# Patient Record
Sex: FEMALE | Race: BLACK OR AFRICAN AMERICAN | NOT HISPANIC OR LATINO | Employment: OTHER | URBAN - METROPOLITAN AREA
[De-identification: names, ages, dates, MRNs, and addresses within clinical notes are randomized per-mention and may not be internally consistent; named-entity substitution may affect disease eponyms.]

---

## 2017-09-28 ENCOUNTER — HOSPITAL ENCOUNTER (EMERGENCY)
Facility: HOSPITAL | Age: 32
Discharge: HOME | End: 2017-09-28
Attending: EMERGENCY MEDICINE
Payer: MEDICAID

## 2017-09-28 VITALS
TEMPERATURE: 98 F | RESPIRATION RATE: 16 BRPM | DIASTOLIC BLOOD PRESSURE: 57 MMHG | HEART RATE: 80 BPM | SYSTOLIC BLOOD PRESSURE: 116 MMHG | OXYGEN SATURATION: 100 % | WEIGHT: 157 LBS | BODY MASS INDEX: 26.16 KG/M2 | HEIGHT: 65 IN

## 2017-09-28 DIAGNOSIS — M54.2 NECK PAIN: ICD-10-CM

## 2017-09-28 DIAGNOSIS — M54.9 BACK PAIN, UNSPECIFIED BACK LOCATION, UNSPECIFIED BACK PAIN LATERALITY, UNSPECIFIED CHRONICITY: Primary | ICD-10-CM

## 2017-09-28 LAB
B-HCG UR QL: NEGATIVE
CTP QC/QA: YES

## 2017-09-28 PROCEDURE — 99283 EMERGENCY DEPT VISIT LOW MDM: CPT | Mod: 25

## 2017-09-28 PROCEDURE — 63600175 PHARM REV CODE 636 W HCPCS: Performed by: NURSE PRACTITIONER

## 2017-09-28 PROCEDURE — 81025 URINE PREGNANCY TEST: CPT | Performed by: NURSE PRACTITIONER

## 2017-09-28 PROCEDURE — 96372 THER/PROPH/DIAG INJ SC/IM: CPT

## 2017-09-28 RX ORDER — ORPHENADRINE CITRATE 100 MG/1
100 TABLET, EXTENDED RELEASE ORAL 2 TIMES DAILY PRN
Qty: 20 TABLET | Refills: 0 | Status: SHIPPED | OUTPATIENT
Start: 2017-09-28 | End: 2017-10-08

## 2017-09-28 RX ORDER — DICLOFENAC SODIUM 50 MG/1
50 TABLET, DELAYED RELEASE ORAL 3 TIMES DAILY
Qty: 30 TABLET | Refills: 0 | Status: SHIPPED | OUTPATIENT
Start: 2017-09-28 | End: 2022-12-25

## 2017-09-28 RX ORDER — KETOROLAC TROMETHAMINE 30 MG/ML
60 INJECTION, SOLUTION INTRAMUSCULAR; INTRAVENOUS
Status: COMPLETED | OUTPATIENT
Start: 2017-09-28 | End: 2017-09-28

## 2017-09-28 RX ADMIN — KETOROLAC TROMETHAMINE 60 MG: 30 INJECTION, SOLUTION INTRAMUSCULAR at 11:09

## 2017-09-29 NOTE — ED NOTES
Pt reports back pain with mild improvement with otc medication. 

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
## 2020-05-29 ENCOUNTER — LAB VISIT (OUTPATIENT)
Dept: PRIMARY CARE CLINIC | Facility: CLINIC | Age: 35
End: 2020-05-29
Payer: MEDICAID

## 2020-05-29 DIAGNOSIS — R05.9 COUGH: Primary | ICD-10-CM

## 2020-05-29 PROCEDURE — U0003 INFECTIOUS AGENT DETECTION BY NUCLEIC ACID (DNA OR RNA); SEVERE ACUTE RESPIRATORY SYNDROME CORONAVIRUS 2 (SARS-COV-2) (CORONAVIRUS DISEASE [COVID-19]), AMPLIFIED PROBE TECHNIQUE, MAKING USE OF HIGH THROUGHPUT TECHNOLOGIES AS DESCRIBED BY CMS-2020-01-R: HCPCS

## 2020-05-31 LAB — SARS-COV-2 RNA RESP QL NAA+PROBE: NOT DETECTED

## 2021-03-12 ENCOUNTER — OFFICE VISIT (OUTPATIENT)
Dept: URGENT CARE | Facility: CLINIC | Age: 36
End: 2021-03-12
Payer: MEDICAID

## 2021-03-12 VITALS
DIASTOLIC BLOOD PRESSURE: 62 MMHG | HEART RATE: 79 BPM | RESPIRATION RATE: 16 BRPM | TEMPERATURE: 98 F | SYSTOLIC BLOOD PRESSURE: 115 MMHG

## 2021-03-12 DIAGNOSIS — Z20.822 ENCOUNTER FOR LABORATORY TESTING FOR COVID-19 VIRUS: ICD-10-CM

## 2021-03-12 DIAGNOSIS — Z20.822 COVID-19 VIRUS NOT DETECTED: Primary | ICD-10-CM

## 2021-03-12 LAB
CTP QC/QA: YES
SARS-COV-2 RDRP RESP QL NAA+PROBE: NEGATIVE

## 2021-03-12 PROCEDURE — 99203 PR OFFICE/OUTPT VISIT, NEW, LEVL III, 30-44 MIN: ICD-10-PCS | Mod: S$GLB,,, | Performed by: NURSE PRACTITIONER

## 2021-03-12 PROCEDURE — 87635 PR SARS-COV-2 COVID-19 AMPLIFIED PROBE: ICD-10-PCS | Mod: QW,S$GLB,, | Performed by: NURSE PRACTITIONER

## 2021-03-12 PROCEDURE — 99203 OFFICE O/P NEW LOW 30 MIN: CPT | Mod: S$GLB,,, | Performed by: NURSE PRACTITIONER

## 2021-03-12 PROCEDURE — 87635 SARS-COV-2 COVID-19 AMP PRB: CPT | Mod: QW,S$GLB,, | Performed by: NURSE PRACTITIONER

## 2021-04-29 ENCOUNTER — PATIENT MESSAGE (OUTPATIENT)
Dept: RESEARCH | Facility: HOSPITAL | Age: 36
End: 2021-04-29

## 2021-07-01 ENCOUNTER — PATIENT MESSAGE (OUTPATIENT)
Dept: ADMINISTRATIVE | Facility: OTHER | Age: 36
End: 2021-07-01

## 2021-12-22 ENCOUNTER — HOSPITAL ENCOUNTER (EMERGENCY)
Facility: HOSPITAL | Age: 36
Discharge: HOME | End: 2021-12-22
Attending: EMERGENCY MEDICINE
Payer: MEDICAID

## 2021-12-22 VITALS
HEIGHT: 63 IN | HEART RATE: 93 BPM | RESPIRATION RATE: 18 BRPM | DIASTOLIC BLOOD PRESSURE: 69 MMHG | BODY MASS INDEX: 28 KG/M2 | SYSTOLIC BLOOD PRESSURE: 126 MMHG | WEIGHT: 158 LBS | OXYGEN SATURATION: 100 % | TEMPERATURE: 98 F

## 2021-12-22 DIAGNOSIS — Z20.822 CONTACT WITH AND (SUSPECTED) EXPOSURE TO COVID-19: Primary | ICD-10-CM

## 2021-12-22 LAB — SARS-COV-2 RDRP RESP QL NAA+PROBE: NEGATIVE

## 2021-12-22 PROCEDURE — 99282 EMERGENCY DEPT VISIT SF MDM: CPT

## 2021-12-22 PROCEDURE — U0002 COVID-19 LAB TEST NON-CDC: HCPCS | Performed by: NURSE PRACTITIONER

## 2021-12-26 ENCOUNTER — HOSPITAL ENCOUNTER (EMERGENCY)
Facility: HOSPITAL | Age: 36
Discharge: HOME | End: 2021-12-26
Attending: EMERGENCY MEDICINE
Payer: MEDICAID

## 2021-12-26 VITALS
BODY MASS INDEX: 26.33 KG/M2 | DIASTOLIC BLOOD PRESSURE: 84 MMHG | HEART RATE: 83 BPM | WEIGHT: 158 LBS | SYSTOLIC BLOOD PRESSURE: 127 MMHG | HEIGHT: 65 IN | OXYGEN SATURATION: 95 % | RESPIRATION RATE: 18 BRPM | TEMPERATURE: 98 F

## 2021-12-26 DIAGNOSIS — U07.1 LAB TEST POSITIVE FOR DETECTION OF COVID-19 VIRUS: Primary | ICD-10-CM

## 2021-12-26 LAB — SARS-COV-2 RDRP RESP QL NAA+PROBE: POSITIVE

## 2021-12-26 PROCEDURE — U0002 COVID-19 LAB TEST NON-CDC: HCPCS | Performed by: EMERGENCY MEDICINE

## 2021-12-26 PROCEDURE — 99282 EMERGENCY DEPT VISIT SF MDM: CPT

## 2022-04-15 ENCOUNTER — HOSPITAL ENCOUNTER (EMERGENCY)
Facility: HOSPITAL | Age: 37
Discharge: HOME | End: 2022-04-15
Attending: EMERGENCY MEDICINE
Payer: MEDICAID

## 2022-04-15 ENCOUNTER — OFFICE VISIT (OUTPATIENT)
Dept: URGENT CARE | Facility: CLINIC | Age: 37
End: 2022-04-15
Payer: MEDICAID

## 2022-04-15 VITALS
RESPIRATION RATE: 16 BRPM | SYSTOLIC BLOOD PRESSURE: 106 MMHG | DIASTOLIC BLOOD PRESSURE: 71 MMHG | WEIGHT: 158 LBS | TEMPERATURE: 99 F | HEART RATE: 76 BPM | BODY MASS INDEX: 28 KG/M2 | HEIGHT: 63 IN

## 2022-04-15 VITALS
HEART RATE: 70 BPM | DIASTOLIC BLOOD PRESSURE: 68 MMHG | OXYGEN SATURATION: 98 % | WEIGHT: 158.31 LBS | BODY MASS INDEX: 26.34 KG/M2 | RESPIRATION RATE: 18 BRPM | TEMPERATURE: 99 F | SYSTOLIC BLOOD PRESSURE: 128 MMHG

## 2022-04-15 DIAGNOSIS — L03.011 CELLULITIS OF FINGER OF RIGHT HAND: Primary | ICD-10-CM

## 2022-04-15 LAB
B-HCG UR QL: NEGATIVE
HCV AB SERPL QL IA: NEGATIVE
HIV 1+2 AB+HIV1 P24 AG SERPL QL IA: NEGATIVE

## 2022-04-15 PROCEDURE — 99284 EMERGENCY DEPT VISIT MOD MDM: CPT

## 2022-04-15 PROCEDURE — 3074F SYST BP LT 130 MM HG: CPT | Mod: CPTII,S$GLB,, | Performed by: NURSE PRACTITIONER

## 2022-04-15 PROCEDURE — 3078F PR MOST RECENT DIASTOLIC BLOOD PRESSURE < 80 MM HG: ICD-10-PCS | Mod: CPTII,S$GLB,, | Performed by: NURSE PRACTITIONER

## 2022-04-15 PROCEDURE — 3008F PR BODY MASS INDEX (BMI) DOCUMENTED: ICD-10-PCS | Mod: CPTII,S$GLB,, | Performed by: NURSE PRACTITIONER

## 2022-04-15 PROCEDURE — 99213 PR OFFICE/OUTPT VISIT, EST, LEVL III, 20-29 MIN: ICD-10-PCS | Mod: S$GLB,,, | Performed by: NURSE PRACTITIONER

## 2022-04-15 PROCEDURE — 1160F RVW MEDS BY RX/DR IN RCRD: CPT | Mod: CPTII,S$GLB,, | Performed by: NURSE PRACTITIONER

## 2022-04-15 PROCEDURE — 3008F BODY MASS INDEX DOCD: CPT | Mod: CPTII,S$GLB,, | Performed by: NURSE PRACTITIONER

## 2022-04-15 PROCEDURE — 36415 COLL VENOUS BLD VENIPUNCTURE: CPT | Performed by: EMERGENCY MEDICINE

## 2022-04-15 PROCEDURE — 99213 OFFICE O/P EST LOW 20 MIN: CPT | Mod: S$GLB,,, | Performed by: NURSE PRACTITIONER

## 2022-04-15 PROCEDURE — 3074F PR MOST RECENT SYSTOLIC BLOOD PRESSURE < 130 MM HG: ICD-10-PCS | Mod: CPTII,S$GLB,, | Performed by: NURSE PRACTITIONER

## 2022-04-15 PROCEDURE — 87389 HIV-1 AG W/HIV-1&-2 AB AG IA: CPT | Performed by: EMERGENCY MEDICINE

## 2022-04-15 PROCEDURE — 1160F PR REVIEW ALL MEDS BY PRESCRIBER/CLIN PHARMACIST DOCUMENTED: ICD-10-PCS | Mod: CPTII,S$GLB,, | Performed by: NURSE PRACTITIONER

## 2022-04-15 PROCEDURE — 3078F DIAST BP <80 MM HG: CPT | Mod: CPTII,S$GLB,, | Performed by: NURSE PRACTITIONER

## 2022-04-15 PROCEDURE — 81025 URINE PREGNANCY TEST: CPT | Performed by: EMERGENCY MEDICINE

## 2022-04-15 PROCEDURE — 1159F PR MEDICATION LIST DOCUMENTED IN MEDICAL RECORD: ICD-10-PCS | Mod: CPTII,S$GLB,, | Performed by: NURSE PRACTITIONER

## 2022-04-15 PROCEDURE — 86803 HEPATITIS C AB TEST: CPT | Performed by: EMERGENCY MEDICINE

## 2022-04-15 PROCEDURE — 1159F MED LIST DOCD IN RCRD: CPT | Mod: CPTII,S$GLB,, | Performed by: NURSE PRACTITIONER

## 2022-04-15 PROCEDURE — 25000003 PHARM REV CODE 250: Performed by: EMERGENCY MEDICINE

## 2022-04-15 RX ORDER — ONDANSETRON 4 MG/1
4 TABLET, ORALLY DISINTEGRATING ORAL EVERY 6 HOURS PRN
Qty: 12 TABLET | Refills: 0 | Status: SHIPPED | OUTPATIENT
Start: 2022-04-15

## 2022-04-15 RX ORDER — HYDROCODONE BITARTRATE AND ACETAMINOPHEN 5; 325 MG/1; MG/1
1 TABLET ORAL EVERY 6 HOURS PRN
Qty: 6 TABLET | Refills: 0 | Status: SHIPPED | OUTPATIENT
Start: 2022-04-15 | End: 2022-12-25

## 2022-04-15 RX ORDER — CEPHALEXIN 500 MG/1
500 CAPSULE ORAL 4 TIMES DAILY
Qty: 28 CAPSULE | Refills: 0 | Status: SHIPPED | OUTPATIENT
Start: 2022-04-15 | End: 2022-04-22

## 2022-04-15 RX ORDER — CEPHALEXIN 250 MG/1
500 CAPSULE ORAL
Status: COMPLETED | OUTPATIENT
Start: 2022-04-15 | End: 2022-04-15

## 2022-04-15 RX ORDER — IBUPROFEN 400 MG/1
400 TABLET ORAL
Status: COMPLETED | OUTPATIENT
Start: 2022-04-15 | End: 2022-04-15

## 2022-04-15 RX ORDER — KETOROLAC TROMETHAMINE 30 MG/ML
30 INJECTION, SOLUTION INTRAMUSCULAR; INTRAVENOUS
Status: COMPLETED | OUTPATIENT
Start: 2022-04-15 | End: 2022-04-15

## 2022-04-15 RX ADMIN — IBUPROFEN 400 MG: 400 TABLET, FILM COATED ORAL at 02:04

## 2022-04-15 RX ADMIN — KETOROLAC TROMETHAMINE 30 MG: 30 INJECTION, SOLUTION INTRAMUSCULAR; INTRAVENOUS at 06:04

## 2022-04-15 RX ADMIN — CEPHALEXIN 500 MG: 250 CAPSULE ORAL at 02:04

## 2022-04-15 NOTE — PROGRESS NOTES
"Subjective:       Patient ID: Soniya Tran is a 37 y.o. female.    Vitals:  height is 5' 3" (1.6 m) and weight is 71.7 kg (158 lb). Her temperature is 99.3 °F (37.4 °C). Her blood pressure is 106/71 and her pulse is 76. Her respiration is 16.     Chief Complaint: Follow-up and Finger Pain    Soniya Tran is a 37 y.o. female who presents to clinic for re-evaluation of right middle finger pain and infection that began 2 weeks ago. 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  She denies any specific injury or foreign body sensation.  Pain is worse with some movement.  She notes mild swelling that is been stable and no redness or drainage.  She states it mildly improved after soaking in vodka last week but is not resolved.  She denies numbness or weakness.  There is no pain to the remainder of the proximal finger or hand.    She was seen in the ED earlier this morning (04/15/2022) and diagnosed with cellulitis. During her ED visit, she was given the first dose of Keflex and sent home with pain medication. She states she felt nauseated and threw up after taking the second dose of Keflex. She is requesting a "shot" for her symptoms, which she reports helped in the past with this same issue. She denies new injury or change in her symptoms.         ROS    Objective:      Physical Exam   Constitutional: She is oriented to person, place, and time.  Non-toxic appearance. No distress. normal  HENT:   Head: Normocephalic and atraumatic.   Ears:   Right Ear: External ear normal.   Left Ear: External ear normal.   Eyes: Conjunctivae are normal. No scleral icterus.      extraocular movement intact   Neck: Neck supple.   Cardiovascular: Normal rate.   Pulses:       Radial pulses are 2+ on the right side and 2+ on the left side.   Pulmonary/Chest: Effort normal. No respiratory distress.   Abdominal: Normal appearance.   Musculoskeletal:        Hands:    Neurological: no focal deficit. " She is alert and oriented to person, place, and time. Coordination and gait normal.   Skin: Skin is warm, dry and not diaphoretic.   Psychiatric: Her behavior is normal. Mood normal.   Nursing note and vitals reviewed.        Assessment:       1. Cellulitis of finger of right hand          Plan:       VSS. On exam, she is afebrile and appears uncomfortable. Old records reviewed, unclear what medication she received when she had this issue several years ago. Patient medicated with 30 mg Toradol IM with significant relief of her pain. Prescription sent to pharmacy for Zofran so that she may take before her next dose of Keflex. Advised close follow up with her PCP and ED evaluation for new/worsening symptoms. Verbalized understanding and all questions answered.     Cellulitis of finger of right hand    Other orders  -     ondansetron (ZOFRAN-ODT) 4 MG TbDL; Take 1 tablet (4 mg total) by mouth every 6 (six) hours as needed (nausea or vomiting).  Dispense: 12 tablet; Refill: 0  -     ketorolac injection 30 mg

## 2022-04-15 NOTE — PATIENT INSTRUCTIONS
Do not take any more Diclofenac or other NSAIDs today (ex: ibuprofen, naproxen/aleve). You may take acetaminophen/tylenol which is OTC. Try taking Zofran at least 15 minutes prior to antibiotics and pain medication to reduce risk of vomiting. Go to the ED for worsening pain, erythema that extends down hand/arm, development of fever > 100.4F.

## 2022-04-15 NOTE — ED PROVIDER NOTES
Chief complaint:  Finger Pain (Pain and swelling to middle finger on right hand. No known trauma)      HPI:  Soniya Tran is a 37 y.o. female presenting with right third finger pain. Patient describes 2 weeks of constant pain in the distal right 3rd finger.  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  She denies any specific injury or foreign body sensation.  Pain is worse with some movement.  She notes mild swelling that is been stable and no redness or drainage.  She states it mildly improved after soaking in vodka last week but is not resolved.  She denies numbness or weakness.  There is no pain to the remainder of the proximal finger or hand.    ROS: As per HPI and below:  No fever, chills, numbness, weakness, rash elsewhere.    Review of patient's allergies indicates:  No Known Allergies    Patient's Medications   New Prescriptions    No medications on file   Previous Medications    DICLOFENAC (VOLTAREN) 50 MG EC TABLET    Take 1 tablet (50 mg total) by mouth 3 (three) times daily.   Modified Medications    No medications on file   Discontinued Medications    No medications on file       PMH:  As per HPI and below:  History reviewed. No pertinent past medical history.  History reviewed. No pertinent surgical history.    No history of diabetes    Social History     Socioeconomic History    Marital status: Single   Tobacco Use    Smoking status: Never Smoker    Smokeless tobacco: Never Used   Substance and Sexual Activity    Alcohol use: Yes     Comment: occasional    Drug use: No    Sexual activity: Yes     Partners: Male     Birth control/protection: Implant       History reviewed. No pertinent family history.    Physical Exam:    Vitals:    04/15/22 0131   BP: 113/76   Pulse: 77   Resp: 16   Temp: 98.5 °F (36.9 °C)     GENERAL:  No apparent distress.  Alert.    HEENT:  Moist mucous membranes.  Normocephalic and atraumatic.    NECK:  No swelling.  Midline  trachea.   CARDIOVASCULAR:  Regular rate and rhythm.  2+ radial pulses.    PULMONARY:  Lungs clear to auscultation bilaterally.  No wheezes, rales, or rhonci.    EXTREMITIES:  Warm and well perfused.  Brisk capillary refill.  Tenderness with mild edema to the distal phalanx of the right 3rd finger with no tenderness or edema to the remainder of the finger.  She has full active range of the motion of the finger with mild pain.  There is no tenderness to the remainder of the hand.  There is no erythema or increased warmth.  There is no fluctuance.  There is no drainage.  There is no edema around the nail bed with painted artificial nails in place.  She has intact motor function in the finger with equal sensation to touch distally compared to opposite side.  NEUROLOGICAL:  Normal mental status.  Appropriate and conversant.    SKIN:  No rashes or ecchymoses.      Labs Reviewed   HIV 1 / 2 ANTIBODY   HEPATITIS C ANTIBODY       Current Discharge Medication List      CONTINUE these medications which have NOT CHANGED    Details   diclofenac (VOLTAREN) 50 MG EC tablet Take 1 tablet (50 mg total) by mouth 3 (three) times daily.  Qty: 30 tablet, Refills: 0             Orders Placed This Encounter   Procedures    HIV 1/2 Ag/Ab (4th Gen)    Hepatitis C Antibody       Imaging Results    None              MDM:    37 y.o. female with right 3rd finger pain likely representing distal cellulitis without drainable eponychial or paronychia.  I doubt felon.  Analgesia recommended for pain with short-term prescription given for home in addition to antibiotics.  I doubt abscess.  I have very low suspicion for tenosynovitis with no Kanavel signs.  There is no history of trauma and I doubt fracture or dislocation.  I doubt foreign body.  I do not think further imaging is indicated.  Outpatient follow-up for reassessment and detailed return precautions reviewed.  First dose of antibiotics given prior to discharge.    Diagnoses:    1. Right  third finger distal phalanx cellulitis     Hayden Sen MD  04/15/22 2274

## 2022-04-15 NOTE — DISCHARGE INSTRUCTIONS
As we discussed, return to the emergency department for new or worsening symptoms including worsening finger pain, swelling, or pus drainage.

## 2022-12-01 ENCOUNTER — HOSPITAL ENCOUNTER (EMERGENCY)
Facility: HOSPITAL | Age: 37
Discharge: HOME | End: 2022-12-02
Attending: EMERGENCY MEDICINE
Payer: MEDICAID

## 2022-12-01 VITALS
BODY MASS INDEX: 29.23 KG/M2 | OXYGEN SATURATION: 99 % | RESPIRATION RATE: 18 BRPM | TEMPERATURE: 98 F | SYSTOLIC BLOOD PRESSURE: 123 MMHG | WEIGHT: 165 LBS | DIASTOLIC BLOOD PRESSURE: 67 MMHG | HEIGHT: 63 IN | HEART RATE: 84 BPM

## 2022-12-01 DIAGNOSIS — R10.33 PERIUMBILICAL PAIN: ICD-10-CM

## 2022-12-01 DIAGNOSIS — N30.90 CYSTITIS: Primary | ICD-10-CM

## 2022-12-01 LAB
ALBUMIN SERPL BCP-MCNC: 4.1 G/DL (ref 3.5–5.2)
ALP SERPL-CCNC: 64 U/L (ref 55–135)
ALT SERPL W/O P-5'-P-CCNC: 26 U/L (ref 10–44)
ANION GAP SERPL CALC-SCNC: 11 MMOL/L (ref 8–16)
AST SERPL-CCNC: 20 U/L (ref 10–40)
B-HCG UR QL: NEGATIVE
BACTERIA #/AREA URNS HPF: ABNORMAL /HPF
BASOPHILS # BLD AUTO: 0.02 K/UL (ref 0–0.2)
BASOPHILS NFR BLD: 0.3 % (ref 0–1.9)
BILIRUB SERPL-MCNC: 0.7 MG/DL (ref 0.1–1)
BILIRUB UR QL STRIP: NEGATIVE
BUN SERPL-MCNC: 11 MG/DL (ref 6–20)
CALCIUM SERPL-MCNC: 9.7 MG/DL (ref 8.7–10.5)
CHLORIDE SERPL-SCNC: 102 MMOL/L (ref 95–110)
CLARITY UR: CLEAR
CO2 SERPL-SCNC: 23 MMOL/L (ref 23–29)
COLOR UR: YELLOW
CREAT SERPL-MCNC: 1 MG/DL (ref 0.5–1.4)
DIFFERENTIAL METHOD: NORMAL
EOSINOPHIL # BLD AUTO: 0.1 K/UL (ref 0–0.5)
EOSINOPHIL NFR BLD: 1.4 % (ref 0–8)
ERYTHROCYTE [DISTWIDTH] IN BLOOD BY AUTOMATED COUNT: 13.3 % (ref 11.5–14.5)
EST. GFR  (NO RACE VARIABLE): >60 ML/MIN/1.73 M^2
GLUCOSE SERPL-MCNC: 119 MG/DL (ref 70–110)
GLUCOSE UR QL STRIP: NEGATIVE
HCT VFR BLD AUTO: 40 % (ref 37–48.5)
HGB BLD-MCNC: 13.1 G/DL (ref 12–16)
HGB UR QL STRIP: NEGATIVE
IMM GRANULOCYTES # BLD AUTO: 0.02 K/UL (ref 0–0.04)
IMM GRANULOCYTES NFR BLD AUTO: 0.3 % (ref 0–0.5)
KETONES UR QL STRIP: ABNORMAL
LEUKOCYTE ESTERASE UR QL STRIP: ABNORMAL
LYMPHOCYTES # BLD AUTO: 3.5 K/UL (ref 1–4.8)
LYMPHOCYTES NFR BLD: 45.5 % (ref 18–48)
MCH RBC QN AUTO: 28.8 PG (ref 27–31)
MCHC RBC AUTO-ENTMCNC: 32.8 G/DL (ref 32–36)
MCV RBC AUTO: 88 FL (ref 82–98)
MICROSCOPIC COMMENT: ABNORMAL
MONOCYTES # BLD AUTO: 0.8 K/UL (ref 0.3–1)
MONOCYTES NFR BLD: 10.6 % (ref 4–15)
NEUTROPHILS # BLD AUTO: 3.3 K/UL (ref 1.8–7.7)
NEUTROPHILS NFR BLD: 41.9 % (ref 38–73)
NITRITE UR QL STRIP: NEGATIVE
NRBC BLD-RTO: 0 /100 WBC
PH UR STRIP: 6 [PH] (ref 5–8)
PLATELET # BLD AUTO: 322 K/UL (ref 150–450)
PMV BLD AUTO: 10.6 FL (ref 9.2–12.9)
POTASSIUM SERPL-SCNC: 3.6 MMOL/L (ref 3.5–5.1)
PROT SERPL-MCNC: 8 G/DL (ref 6–8.4)
PROT UR QL STRIP: NEGATIVE
RBC # BLD AUTO: 4.55 M/UL (ref 4–5.4)
RBC #/AREA URNS HPF: 0 /HPF (ref 0–4)
SODIUM SERPL-SCNC: 136 MMOL/L (ref 136–145)
SP GR UR STRIP: >=1.03 (ref 1–1.03)
SQUAMOUS #/AREA URNS HPF: 2 /HPF
URN SPEC COLLECT METH UR: ABNORMAL
UROBILINOGEN UR STRIP-ACNC: 1 EU/DL
WBC # BLD AUTO: 7.74 K/UL (ref 3.9–12.7)
WBC #/AREA URNS HPF: 17 /HPF (ref 0–5)

## 2022-12-01 PROCEDURE — 81025 URINE PREGNANCY TEST: CPT | Performed by: EMERGENCY MEDICINE

## 2022-12-01 PROCEDURE — 87389 HIV-1 AG W/HIV-1&-2 AB AG IA: CPT | Performed by: EMERGENCY MEDICINE

## 2022-12-01 PROCEDURE — 87088 URINE BACTERIA CULTURE: CPT | Performed by: EMERGENCY MEDICINE

## 2022-12-01 PROCEDURE — 25000003 PHARM REV CODE 250: Performed by: EMERGENCY MEDICINE

## 2022-12-01 PROCEDURE — 80053 COMPREHEN METABOLIC PANEL: CPT | Performed by: EMERGENCY MEDICINE

## 2022-12-01 PROCEDURE — 85025 COMPLETE CBC W/AUTO DIFF WBC: CPT | Performed by: EMERGENCY MEDICINE

## 2022-12-01 PROCEDURE — 25500020 PHARM REV CODE 255

## 2022-12-01 PROCEDURE — 36415 COLL VENOUS BLD VENIPUNCTURE: CPT | Performed by: EMERGENCY MEDICINE

## 2022-12-01 PROCEDURE — 86803 HEPATITIS C AB TEST: CPT | Performed by: EMERGENCY MEDICINE

## 2022-12-01 PROCEDURE — 87086 URINE CULTURE/COLONY COUNT: CPT | Performed by: EMERGENCY MEDICINE

## 2022-12-01 PROCEDURE — 81000 URINALYSIS NONAUTO W/SCOPE: CPT | Performed by: EMERGENCY MEDICINE

## 2022-12-01 PROCEDURE — 99285 EMERGENCY DEPT VISIT HI MDM: CPT | Mod: 25

## 2022-12-01 RX ORDER — LIDOCAINE HYDROCHLORIDE 20 MG/ML
15 SOLUTION OROPHARYNGEAL ONCE
Status: COMPLETED | OUTPATIENT
Start: 2022-12-01 | End: 2022-12-01

## 2022-12-01 RX ORDER — MAG HYDROX/ALUMINUM HYD/SIMETH 200-200-20
30 SUSPENSION, ORAL (FINAL DOSE FORM) ORAL ONCE
Status: COMPLETED | OUTPATIENT
Start: 2022-12-01 | End: 2022-12-01

## 2022-12-01 RX ORDER — IBUPROFEN 600 MG/1
600 TABLET ORAL EVERY 6 HOURS PRN
Qty: 20 TABLET | Refills: 0 | Status: SHIPPED | OUTPATIENT
Start: 2022-12-01 | End: 2022-12-25

## 2022-12-01 RX ORDER — NITROFURANTOIN 25; 75 MG/1; MG/1
100 CAPSULE ORAL EVERY 12 HOURS
Status: DISCONTINUED | OUTPATIENT
Start: 2022-12-01 | End: 2022-12-02 | Stop reason: HOSPADM

## 2022-12-01 RX ORDER — NITROFURANTOIN 25; 75 MG/1; MG/1
100 CAPSULE ORAL 2 TIMES DAILY
Qty: 14 CAPSULE | Refills: 0 | Status: SHIPPED | OUTPATIENT
Start: 2022-12-01 | End: 2022-12-08

## 2022-12-01 RX ADMIN — ALUMINUM HYDROXIDE, MAGNESIUM HYDROXIDE, AND SIMETHICONE 30 ML: 200; 200; 20 SUSPENSION ORAL at 09:12

## 2022-12-01 RX ADMIN — LIDOCAINE HYDROCHLORIDE 15 ML: 20 SOLUTION ORAL; TOPICAL at 09:12

## 2022-12-01 RX ADMIN — IOHEXOL 75 ML: 350 INJECTION, SOLUTION INTRAVENOUS at 10:12

## 2022-12-01 RX ADMIN — NITROFURANTOIN (MONOHYDRATE/MACROCRYSTALS) 100 MG: 75; 25 CAPSULE ORAL at 11:12

## 2022-12-02 LAB
HCV AB SERPL QL IA: NORMAL
HIV 1+2 AB+HIV1 P24 AG SERPL QL IA: NORMAL

## 2022-12-02 NOTE — ED PROVIDER NOTES
"Encounter Date: 12/1/2022    SCRIBE #1 NOTE: I, Deboraharnel Interiano, am scribing for, and in the presence of,  Karl Jacques MD.     History     Chief Complaint   Patient presents with    Rectal Bleeding     Time seen by provider: 8:57 PM on 12/01/2022    Soniya Tran is a 37 y.o. female who presents to the ED with an onset of diffuse "uncomfortable" abdominal pain, bright red droplets of blood in the stool, and urinary frequency that began earlier today. Patient states she only ate once today but she feels full. The patient denies constipation, burning with urination or any other symptoms at this time. She has no recorded PMHx or PSHx. Patient is on Nexplanon implant birth control and it was inserted in February.    The history is provided by the patient.   Review of patient's allergies indicates:  No Known Allergies  History reviewed. No pertinent past medical history.  History reviewed. No pertinent surgical history.  History reviewed. No pertinent family history.  Social History     Tobacco Use    Smoking status: Never    Smokeless tobacco: Never   Substance Use Topics    Alcohol use: Yes     Comment: occasional    Drug use: No     Review of Systems   Constitutional:  Negative for fever.   Respiratory:  Negative for shortness of breath.    Gastrointestinal:  Positive for abdominal pain and blood in stool. Negative for constipation.        Positive for a feeling of fullness.   Genitourinary:  Positive for frequency. Negative for dysuria and flank pain.   Musculoskeletal:  Negative for gait problem.   Neurological:  Negative for weakness.   Psychiatric/Behavioral:  Negative for confusion.      Physical Exam     Initial Vitals [12/01/22 1934]   BP Pulse Resp Temp SpO2   123/67 84 18 98.3 °F (36.8 °C) 99 %      MAP       --         Physical Exam    Nursing note and vitals reviewed.  Constitutional: She appears well-developed and well-nourished.   HENT:   Head: Normocephalic and atraumatic.   Eyes: Conjunctivae are " normal.   Neck: Neck supple.   Normal range of motion.  Cardiovascular:  Normal rate, regular rhythm and normal heart sounds.     Exam reveals no gallop and no friction rub.       No murmur heard.  Pulmonary/Chest: Effort normal and breath sounds normal. No respiratory distress. She has no wheezes. She has no rhonchi. She has no rales.   Abdominal: Abdomen is soft. She exhibits no distension. There is abdominal tenderness in the right upper quadrant and right lower quadrant. There is no rebound and no guarding.   Musculoskeletal:         General: Normal range of motion.      Cervical back: Normal range of motion and neck supple.     Neurological: She is alert and oriented to person, place, and time.   Skin: Skin is warm and dry. No erythema.   Psychiatric: She has a normal mood and affect.       ED Course   Procedures  Labs Reviewed   COMPREHENSIVE METABOLIC PANEL - Abnormal; Notable for the following components:       Result Value    Glucose 119 (*)     All other components within normal limits   URINALYSIS, REFLEX TO URINE CULTURE - Abnormal; Notable for the following components:    Specific Gravity, UA >=1.030 (*)     Ketones, UA Trace (*)     Leukocytes, UA 2+ (*)     All other components within normal limits    Narrative:     Specimen Source->Urine   URINALYSIS MICROSCOPIC - Abnormal; Notable for the following components:    WBC, UA 17 (*)     Bacteria Many (*)     All other components within normal limits    Narrative:     Specimen Source->Urine   CULTURE, URINE   HIV 1 / 2 ANTIBODY    Narrative:     Release to patient->Immediate   HEPATITIS C ANTIBODY    Narrative:     Release to patient->Immediate   CBC W/ AUTO DIFFERENTIAL   PREGNANCY TEST, URINE RAPID    Narrative:     Specimen Source->Urine          Imaging Results              CT Abdomen Pelvis With Contrast (Final result)  Result time 12/01/22 22:27:00      Final result by Joseph Pizarro DO (12/01/22 22:27:00)                   Impression:      No  specific etiology to explain patient's abdominal pain.      Electronically signed by: Joseph Pizarro  Date:    12/01/2022  Time:    22:27               Narrative:    EXAMINATION:  CT ABDOMEN PELVIS WITH CONTRAST    CLINICAL HISTORY:  RLQ abdominal pain (Age >= 14y);    TECHNIQUE:  Axial CT images with sagittal and coronal reformats were obtained of the abdomen and pelvis from the hemidiaphragms through the symphysis pubis after the administration of 75mL Omnipaque 350.    COMPARISON:  None available.    FINDINGS:  Lung Bases: Clear.    Heart: Heart size is normal.  No pericardial effusion.    Liver: Small focus of fat deposition adjacent falciform ligament.  The liver is otherwise unremarkable without focal lesion.    Biliary tract: No intrahepatic or extrahepatic biliary ductal dilatation.    Gallbladder: No radiodense gallstone. No wall thickening or pericholecystic fluid.    Pancreas: Normal. No pancreatic ductal dilatation.    Spleen: Normal size without focal lesion.    Adrenals: Unremarkable.    Kidneys and urinary collecting systems: Normal.  No hydronephrosis or urolithiasis.    Lymph nodes: None enlarged.    Stomach and bowel: The stomach is normal.  Loops of small and large bowel are normal in caliber without evidence for inflammation or obstruction.  The appendix is normal.    Peritoneum and mesentery: No ascites or free intraperitoneal air.  No abdominal fluid collection.    Vasculature: No aneurysm or significant atherosclerosis.    Urinary bladder: No wall thickening.    Reproductive organs: There is a dominant follicle in the right ovary.  The uterus and adnexae are unremarkable.    Body wall: There is a fat containing umbilical hernia.    Musculoskeletal: No aggressive osseous lesion.                                       Medications   aluminum-magnesium hydroxide-simethicone 200-200-20 mg/5 mL suspension 30 mL (30 mLs Oral Given 12/1/22 7875)     And   LIDOcaine HCl 2% oral solution 15 mL (15 mLs  "Oral Given 12/1/22 2137)   iohexoL (OMNIPAQUE 350) 350 mg iodine/mL injection (75 mLs Intravenous Given 12/1/22 2207)     Medical Decision Making:   History:   Old Medical Records: I decided to obtain old medical records.  Independently Interpreted Test(s):   I have ordered and independently interpreted X-rays - see prior notes.  Clinical Tests:   Lab Tests: Ordered and Reviewed  Radiological Study: Ordered and Reviewed  ED Management:  Soniya Tran is a 37 y.o. female who presents to the ED with an onset of diffuse "uncomfortable" abdominal pain, bright red droplets of blood in the stool, and urinary frequency that began earlier today.  She has no evidence of intra-abdominal pathology except for cystitis.  She is treated with nitrofurantoin and encouraged to follow-up with her primary care doctor if symptoms persist.     APC / Resident Notes:   I, Dr. Karl Jacques III, personally performed the services described in this documentation. All medical record entries made by the scribe were at my direction and in my presence.  I have reviewed the chart and agree that the record reflects my personal performance and is accurate and complete   Scribe Attestation:   Scribe #1: I performed the above scribed service and the documentation accurately describes the services I performed. I attest to the accuracy of the note.                   Clinical Impression:   Final diagnoses:  [N30.90] Cystitis (Primary)  [R10.33] Periumbilical pain      ED Disposition Condition    Discharge Stable          ED Prescriptions       Medication Sig Dispense Start Date End Date Auth. Provider    nitrofurantoin, macrocrystal-monohydrate, (MACROBID) 100 MG capsule Take 1 capsule (100 mg total) by mouth 2 (two) times daily. for 7 days 14 capsule 12/1/2022 12/8/2022 Karl Jacques III, MD    ibuprofen (ADVIL,MOTRIN) 600 MG tablet Take 1 tablet (600 mg total) by mouth every 6 (six) hours as needed for Pain. 20 tablet 12/1/2022 -- Karl Jacques " III, MD          Follow-up Information    None          Karl Jacques III, MD  12/02/22 2760

## 2022-12-03 LAB — BACTERIA UR CULT: ABNORMAL

## 2022-12-25 ENCOUNTER — HOSPITAL ENCOUNTER (EMERGENCY)
Facility: HOSPITAL | Age: 37
Discharge: HOME | End: 2022-12-26
Attending: EMERGENCY MEDICINE
Payer: MEDICAID

## 2022-12-25 VITALS
RESPIRATION RATE: 20 BRPM | DIASTOLIC BLOOD PRESSURE: 60 MMHG | TEMPERATURE: 98 F | HEIGHT: 63 IN | BODY MASS INDEX: 27.46 KG/M2 | HEART RATE: 89 BPM | WEIGHT: 155 LBS | OXYGEN SATURATION: 99 % | SYSTOLIC BLOOD PRESSURE: 129 MMHG

## 2022-12-25 DIAGNOSIS — H16.9 KERATITIS, BILATERAL: Primary | ICD-10-CM

## 2022-12-25 DIAGNOSIS — S05.02XA ABRASION OF LEFT CORNEA, INITIAL ENCOUNTER: ICD-10-CM

## 2022-12-25 PROCEDURE — 25000003 PHARM REV CODE 250: Performed by: EMERGENCY MEDICINE

## 2022-12-25 PROCEDURE — 96372 THER/PROPH/DIAG INJ SC/IM: CPT | Performed by: EMERGENCY MEDICINE

## 2022-12-25 PROCEDURE — 63600175 PHARM REV CODE 636 W HCPCS: Performed by: EMERGENCY MEDICINE

## 2022-12-25 PROCEDURE — 99284 EMERGENCY DEPT VISIT MOD MDM: CPT

## 2022-12-25 RX ORDER — HYDROCODONE BITARTRATE AND ACETAMINOPHEN 7.5; 325 MG/1; MG/1
1 TABLET ORAL EVERY 6 HOURS PRN
Qty: 8 TABLET | Refills: 0 | Status: SHIPPED | OUTPATIENT
Start: 2022-12-25 | End: 2022-12-27

## 2022-12-25 RX ORDER — IBUPROFEN 800 MG/1
800 TABLET ORAL EVERY 8 HOURS
Qty: 18 TABLET | Refills: 0 | Status: SHIPPED | OUTPATIENT
Start: 2022-12-25 | End: 2022-12-28

## 2022-12-25 RX ORDER — ERYTHROMYCIN 5 MG/G
OINTMENT OPHTHALMIC
Status: COMPLETED | OUTPATIENT
Start: 2022-12-26 | End: 2022-12-25

## 2022-12-25 RX ORDER — KETOROLAC TROMETHAMINE 30 MG/ML
30 INJECTION, SOLUTION INTRAMUSCULAR; INTRAVENOUS
Status: COMPLETED | OUTPATIENT
Start: 2022-12-25 | End: 2022-12-25

## 2022-12-25 RX ORDER — DEXAMETHASONE SODIUM PHOSPHATE 4 MG/ML
8 INJECTION, SOLUTION INTRA-ARTICULAR; INTRALESIONAL; INTRAMUSCULAR; INTRAVENOUS; SOFT TISSUE
Status: COMPLETED | OUTPATIENT
Start: 2022-12-25 | End: 2022-12-25

## 2022-12-25 RX ADMIN — DEXAMETHASONE SODIUM PHOSPHATE 8 MG: 4 INJECTION, SOLUTION INTRA-ARTICULAR; INTRALESIONAL; INTRAMUSCULAR; INTRAVENOUS; SOFT TISSUE at 10:12

## 2022-12-25 RX ADMIN — ERYTHROMYCIN 1 INCH: 5 OINTMENT OPHTHALMIC at 11:12

## 2022-12-25 RX ADMIN — KETOROLAC TROMETHAMINE 30 MG: 30 INJECTION, SOLUTION INTRAMUSCULAR; INTRAVENOUS at 10:12

## 2022-12-25 RX ADMIN — FLUORESCEIN SODIUM 1 EACH: 1 STRIP OPHTHALMIC at 11:12

## 2022-12-26 PROCEDURE — 25000003 PHARM REV CODE 250: Performed by: EMERGENCY MEDICINE

## 2022-12-26 RX ORDER — ERYTHROMYCIN 5 MG/G
OINTMENT OPHTHALMIC
Qty: 1 G | Refills: 0 | Status: SHIPPED | OUTPATIENT
Start: 2022-12-26

## 2022-12-26 NOTE — ED PROVIDER NOTES
Encounter Date: 12/25/2022    SCRIBE #1 NOTE: I, Micki Quezada, am scribing for, and in the presence of,  Jorge Ford MD.     History     Chief Complaint   Patient presents with    Eye Problem     Lotion on their contacts and in their eyes-perfume lotion      Time seen by provider: 10:00 PM on 12/25/2022    Soniya Tran is a 37 y.o. female who presents to the ED with an onset of bilateral eye pain, redness and tearing after getting lotion in her eyes this evening. She states she has been using a firming lotion for months with no reaction but this is the first time it has gotten in her eyes. Patient was wearing contacts. She removed them and tried to wash her eyes out but denies improvement. The patient denies any other symptoms at this time. No pertinent PSHx.      The history is provided by the patient.   Review of patient's allergies indicates:  No Known Allergies  History reviewed. No pertinent past medical history.  History reviewed. No pertinent surgical history.  History reviewed. No pertinent family history.  Social History     Tobacco Use    Smoking status: Never    Smokeless tobacco: Never   Substance Use Topics    Alcohol use: Yes     Comment: occasional    Drug use: No     Review of Systems   Constitutional:  Negative for fever.   HENT:  Negative for congestion.    Eyes:  Positive for pain, discharge and redness. Negative for visual disturbance.   Respiratory:  Negative for wheezing.    Cardiovascular:  Negative for chest pain.   Gastrointestinal:  Negative for abdominal pain.   Genitourinary:  Negative for dysuria.   Musculoskeletal:  Negative for joint swelling.   Skin:  Negative for rash.   Neurological:  Negative for syncope.   Hematological:  Does not bruise/bleed easily.   Psychiatric/Behavioral:  Negative for confusion.      Physical Exam     Initial Vitals [12/25/22 2153]   BP Pulse Resp Temp SpO2   129/60 89 20 98 °F (36.7 °C) 99 %      MAP       --         Physical Exam    Nursing note  and vitals reviewed.  Constitutional: Vital signs are normal. She appears well-nourished.   HENT:   Head: Normocephalic and atraumatic.   Eyes: EOM are normal. Right conjunctiva is injected. Left conjunctiva is injected.   Bilateral tearing   Neck: Neck supple. No thyroid mass present.   Normal range of motion.  Cardiovascular:  Normal rate, regular rhythm and normal heart sounds.     Exam reveals no gallop and no friction rub.       No murmur heard.  Pulmonary/Chest: Breath sounds normal. She has no wheezes. She has no rhonchi. She has no rales.   Abdominal: Abdomen is soft. Bowel sounds are normal. There is no abdominal tenderness.   Musculoskeletal:      Cervical back: Normal range of motion and neck supple.     Neurological: She is alert and oriented to person, place, and time. She has normal strength. No cranial nerve deficit or sensory deficit.   Skin: Skin is warm and dry. No rash noted. No erythema.   Psychiatric: She has a normal mood and affect. Her speech is normal. Cognition and memory are normal.       ED Course   Procedures  Labs Reviewed - No data to display       Imaging Results    None          Medications   ketorolac injection 30 mg (30 mg Intramuscular Given 12/25/22 2216)   dexAMETHasone injection 8 mg (8 mg Intramuscular Given 12/25/22 2216)   fluorescein ophthalmic strip 1 each (1 each Both Eyes Given 12/25/22 6872)   erythromycin 5 mg/gram (0.5 %) ophthalmic ointment (1 inch Both Eyes Given 12/25/22 9906)     Medical Decision Making:   History:   Old Medical Records: I decided to obtain old medical records.  ED Management:  This patient was emergently assessed shortly after arrival.  Both eyes were irrigated and anesthetized.  She is provided IM Decadron and Toradol.  Exam refills bilateral acute conjunctivitis with pain.  There appears to be a small corneal abrasion at 3:00 on the left eye. As a result of the unusual mechanism and symptoms, I discussed this case with the on-call  ophthalmologist at [facility] who recommends erythromycin ointment 3 times daily and oral analgesia.  She is asked to not use her contact lenses and to only use glasses until she is evaluated by an ophthalmologist which she is urged to do as soon as possible.  She was asked to return to the emergency department immediately for any new, concerning, or worsening symptoms.  Patient and mother were agreeable and she was discharged in stable condition.  The patient reports that her eyesight returned to baseline (when she is not wearing her contact lenses) prior to discharge.        Scribe Attestation:   Scribe #1: I performed the above scribed service and the documentation accurately describes the services I performed. I attest to the accuracy of the note.            I, Dr. Jorge Ford, personally performed the services described in this documentation. All medical record entries made by the scribe were at my direction and in my presence.  I have reviewed the chart and agree that the record reflects my personal performance and is accurate and complete. Jorge Ford MD.  7:34 PM 12/26/2022         Clinical Impression:   Final diagnoses:  [H16.9] Keratitis, bilateral (Primary)  [S05.02XA] Abrasion of left cornea, initial encounter        ED Disposition Condition    Discharge Stable          ED Prescriptions       Medication Sig Dispense Start Date End Date Auth. Provider    HYDROcodone-acetaminophen (NORCO) 7.5-325 mg per tablet Take 1 tablet by mouth every 6 (six) hours as needed for Pain. 8 tablet 12/25/2022 12/27/2022 Jorge Ford MD    ibuprofen (ADVIL,MOTRIN) 800 MG tablet Take 1 tablet (800 mg total) by mouth every 8 (eight) hours. for 3 days 18 tablet 12/25/2022 12/28/2022 Jorge Ford MD    erythromycin (ROMYCIN) ophthalmic ointment Place a 1/2 inch ribbon of ointment into the lower eyelid of both eyes every 8 hours for 5 days. 1 g 12/26/2022 -- Jorge Ford MD          Follow-up Information        Follow up With Specialties Details Why Contact Info    Sukumar Chávez MD Internal Medicine Schedule an appointment as soon as possible for a visit   [address]  [address]  [facility] [address]  [phone]      [facility]  Schedule an appointment as soon as possible for a visit   [phone]             Jorge Ford MD  12/26/22 1936

## 2024-08-17 ENCOUNTER — OFFICE VISIT (OUTPATIENT)
Dept: URGENT CARE | Facility: CLINIC | Age: 39
End: 2024-08-17
Payer: MEDICAID

## 2024-08-17 VITALS
WEIGHT: 166 LBS | DIASTOLIC BLOOD PRESSURE: 71 MMHG | HEIGHT: 63 IN | SYSTOLIC BLOOD PRESSURE: 106 MMHG | RESPIRATION RATE: 18 BRPM | HEART RATE: 79 BPM | OXYGEN SATURATION: 97 % | BODY MASS INDEX: 29.41 KG/M2 | TEMPERATURE: 99 F

## 2024-08-17 DIAGNOSIS — R60.0 LIP EDEMA: ICD-10-CM

## 2024-08-17 DIAGNOSIS — K13.0 ANGULAR CHEILITIS: Primary | ICD-10-CM

## 2024-08-17 PROCEDURE — 99214 OFFICE O/P EST MOD 30 MIN: CPT | Mod: S$GLB,,, | Performed by: NURSE PRACTITIONER

## 2024-08-17 RX ORDER — GLYBURIDE-METFORMIN HYDROCHLORIDE 5; 500 MG/1; MG/1
1 TABLET ORAL
COMMUNITY

## 2024-08-17 RX ORDER — DEXAMETHASONE SODIUM PHOSPHATE 4 MG/ML
4 INJECTION, SOLUTION INTRA-ARTICULAR; INTRALESIONAL; INTRAMUSCULAR; INTRAVENOUS; SOFT TISSUE
Status: COMPLETED | OUTPATIENT
Start: 2024-08-17 | End: 2024-08-17

## 2024-08-17 RX ORDER — PREDNISONE 20 MG/1
40 TABLET ORAL DAILY
Qty: 10 TABLET | Refills: 0 | Status: SHIPPED | OUTPATIENT
Start: 2024-08-18 | End: 2024-08-23

## 2024-08-17 RX ADMIN — DEXAMETHASONE SODIUM PHOSPHATE 4 MG: 4 INJECTION, SOLUTION INTRA-ARTICULAR; INTRALESIONAL; INTRAMUSCULAR; INTRAVENOUS; SOFT TISSUE at 04:08

## 2024-08-17 NOTE — PATIENT INSTRUCTIONS
If you feel your lip swelling is getting worse or you start to having any symptoms to your tongue or throat you need to go to the ER immediately as discussed. Use Antihistamine when you get home.

## 2024-08-17 NOTE — PROGRESS NOTES
"Subjective:      Patient ID: Soniya Tran is a 39 y.o. female.    Vitals:  height is 5' 3" (1.6 m) and weight is 75.3 kg (166 lb). Her oral temperature is 98.5 °F (36.9 °C). Her blood pressure is 106/71 and her pulse is 79. Her respiration is 18 and oxygen saturation is 97%.     Chief Complaint: Oral Swelling    Follow-up  This is a new problem. The current episode started yesterday. The problem occurs constantly. The problem has been unchanged. Pertinent negatives include no coughing, fever or sore throat. The symptoms are aggravated by eating. Treatments tried: Healing ointment. The treatment provided no relief.       Constitution: Negative for fever.   HENT:  Positive for mouth sores. Negative for facial swelling, sore throat and trouble swallowing.    Respiratory:  Negative for cough and shortness of breath.       Objective:     Physical Exam   HENT:   Head:      Comments: Left and right clefts of lips with dry cracked skin.     Lips appear to be mildly swollen. No trismus, tongue elevation, oropharynx without edema and airway patent without uvular swelling.       Pulmonary/Chest: No stridor.   Abdominal: Normal appearance.   Neurological: She is alert.   Psychiatric: Her behavior is normal. Mood, judgment and thought content normal.   Nursing note and vitals reviewed.      Assessment:     1. Angular cheilitis    2. Lip edema        Plan:       Angular cheilitis    Lip edema    Other orders  -     miconazole nitrate 2% (MICOTIN) 2 % Oint; Apply topically 2 (two) times daily. for 14 days  Dispense: 56.7 g; Refill: 0  -     predniSONE (DELTASONE) 20 MG tablet; Take 2 tablets (40 mg total) by mouth once daily. for 5 days  Dispense: 10 tablet; Refill: 0  -     dexAMETHasone injection 4 mg      Pt presents with ongoing lip cracking consistent with angular cheilitis. She also states actual lip swelling since waking at approx 7a.m. (8hrs ago). She has no airway compromise, no oropharyngeal tingling or further " sensations, no stridor. She denies facial swelling otherwise. Her symptoms have not progressed since waking. Discussed several things, will treat for cheilitis. Further discussed concern for angioedema, denies recent change in medication and not on ace-inhibitor.  She states she may have put on new lip gloss she has not used in a while and a facewash but otherwise denies newer environmental or food exposures. I provided her IM steroid and PO Steroids. Further discussed taking Benadryl or additional antihistamine and for any changes in symptoms to go to ER immediately and discussed importance of this.

## 2024-12-22 ENCOUNTER — OFFICE VISIT (OUTPATIENT)
Dept: URGENT CARE | Facility: CLINIC | Age: 39
End: 2024-12-22
Payer: MEDICAID

## 2024-12-22 VITALS
SYSTOLIC BLOOD PRESSURE: 118 MMHG | RESPIRATION RATE: 16 BRPM | HEIGHT: 62 IN | DIASTOLIC BLOOD PRESSURE: 82 MMHG | TEMPERATURE: 100 F | BODY MASS INDEX: 31.1 KG/M2 | OXYGEN SATURATION: 98 % | HEART RATE: 119 BPM | WEIGHT: 169 LBS

## 2024-12-22 DIAGNOSIS — J30.81 ALLERGIC RHINITIS DUE TO ANIMAL HAIR AND DANDER: ICD-10-CM

## 2024-12-22 DIAGNOSIS — R05.9 COUGH, UNSPECIFIED TYPE: ICD-10-CM

## 2024-12-22 DIAGNOSIS — J06.9 UPPER RESPIRATORY TRACT INFECTION, UNSPECIFIED TYPE: Primary | ICD-10-CM

## 2024-12-22 LAB
CTP QC/QA: YES
CTP QC/QA: YES
FLUAV AG NPH QL: NEGATIVE
FLUBV AG NPH QL: NEGATIVE
SARS-COV-2 AG RESP QL IA.RAPID: NEGATIVE

## 2024-12-22 PROCEDURE — 87811 SARS-COV-2 COVID19 W/OPTIC: CPT | Mod: QW,S$GLB,, | Performed by: NURSE PRACTITIONER

## 2024-12-22 PROCEDURE — 99213 OFFICE O/P EST LOW 20 MIN: CPT | Mod: S$GLB,,, | Performed by: NURSE PRACTITIONER

## 2024-12-22 PROCEDURE — 87804 INFLUENZA ASSAY W/OPTIC: CPT | Mod: QW,,, | Performed by: NURSE PRACTITIONER

## 2024-12-22 RX ORDER — BROMPHENIRAMINE MALEATE, PSEUDOEPHEDRINE HYDROCHLORIDE, AND DEXTROMETHORPHAN HYDROBROMIDE 2; 30; 10 MG/5ML; MG/5ML; MG/5ML
5 SYRUP ORAL EVERY 8 HOURS PRN
Qty: 150 ML | Refills: 0 | Status: SHIPPED | OUTPATIENT
Start: 2024-12-22 | End: 2025-01-01

## 2024-12-22 RX ORDER — DEXAMETHASONE SODIUM PHOSPHATE 4 MG/ML
8 INJECTION, SOLUTION INTRA-ARTICULAR; INTRALESIONAL; INTRAMUSCULAR; INTRAVENOUS; SOFT TISSUE ONCE
Status: COMPLETED | OUTPATIENT
Start: 2024-12-22 | End: 2024-12-22

## 2024-12-22 RX ORDER — OMEPRAZOLE 20 MG/1
20 CAPSULE, DELAYED RELEASE ORAL DAILY PRN
COMMUNITY
Start: 2024-11-26

## 2024-12-22 RX ORDER — AZITHROMYCIN 250 MG/1
TABLET, FILM COATED ORAL
Qty: 6 TABLET | Refills: 0 | Status: SHIPPED | OUTPATIENT
Start: 2024-12-22 | End: 2024-12-27

## 2024-12-22 RX ORDER — VENLAFAXINE 37.5 MG/1
37.5 TABLET ORAL
COMMUNITY

## 2024-12-22 RX ORDER — FLUTICASONE PROPIONATE 50 MCG
1 SPRAY, SUSPENSION (ML) NASAL DAILY
Qty: 11.1 ML | Refills: 2 | Status: SHIPPED | OUTPATIENT
Start: 2024-12-22 | End: 2025-03-22

## 2024-12-22 RX ADMIN — DEXAMETHASONE SODIUM PHOSPHATE 8 MG: 4 INJECTION, SOLUTION INTRA-ARTICULAR; INTRALESIONAL; INTRAMUSCULAR; INTRAVENOUS; SOFT TISSUE at 04:12

## 2024-12-22 NOTE — PROGRESS NOTES
"Subjective:      Patient ID: Soniya Tran is a 39 y.o. female.    Vitals:  height is 5' 2" (1.575 m) and weight is 76.7 kg (169 lb). Her temperature is 100.1 °F (37.8 °C). Her blood pressure is 118/82 and her pulse is 119 (abnormal). Her respiration is 16 and oxygen saturation is 98%.     Chief Complaint: Cough    Ms. Tran is a 39 year old female with a history of GERD, NIDDM2 last A1c 7, and depression who presents today with complaints of cough. It is moderate. It is associated with fever, chills, headache, fatigue, and sinus congestion. Initially had a sore throat but that has resolved. She denies chest pain, SOB, N/V/D. Son was sick last week, but his symptoms have resolved. He was not tested for any viral cause.    Cough  Associated symptoms include a fever and a sore throat. Pertinent negatives include no chest pain or shortness of breath.       Constitution: Positive for fatigue and fever. Negative for appetite change.   HENT:  Positive for congestion and sore throat.    Neck: Negative for neck stiffness.   Cardiovascular:  Negative for chest pain.   Eyes:  Negative for blurred vision.   Respiratory:  Positive for cough. Negative for shortness of breath.    Gastrointestinal:  Negative for nausea, vomiting and diarrhea.   Genitourinary:  Negative for dysuria.   Musculoskeletal:  Negative for back pain.   Skin:  Negative for hives.   Allergic/Immunologic: Negative for hives.   Neurological:  Negative for dizziness and light-headedness.      Objective:     Physical Exam   Constitutional: She is oriented to person, place, and time. She appears well-developed. She is cooperative.  Non-toxic appearance. She does not appear ill. No distress.   HENT:   Head: Normocephalic and atraumatic.   Ears:   Right Ear: Hearing and external ear normal.   Left Ear: Hearing and external ear normal.   Nose: Rhinorrhea and congestion present. No mucosal edema or nasal deformity. No epistaxis. Right sinus exhibits no maxillary sinus " tenderness and no frontal sinus tenderness. Left sinus exhibits no maxillary sinus tenderness and no frontal sinus tenderness.   Mouth/Throat: Uvula is midline and mucous membranes are normal. No trismus in the jaw. Normal dentition. No uvula swelling. Oropharyngeal exudate and posterior oropharyngeal erythema present.      Comments: Mild posterior oropharyngeal erythema with an area of white exudate on left tonsil  Eyes: Conjunctivae and lids are normal. Right eye exhibits no discharge. Left eye exhibits no discharge. No scleral icterus.   Neck: Trachea normal and phonation normal. Neck supple.   Cardiovascular: Normal rate.   Pulmonary/Chest: Effort normal. No respiratory distress.   Abdominal: Normal appearance. She exhibits no distension and no pulsatile midline mass.   Musculoskeletal: Normal range of motion.         General: No deformity. Normal range of motion.   Lymphadenopathy:     She has cervical adenopathy.   Neurological: She is alert and oriented to person, place, and time. She exhibits normal muscle tone. Coordination normal.   Skin: Skin is warm, dry, intact, not diaphoretic and not pale.   Psychiatric: Her speech is normal and behavior is normal. Judgment and thought content normal.   Nursing note and vitals reviewed.      Assessment:     1. Upper respiratory tract infection, unspecified type    2. Cough, unspecified type    3. Allergic rhinitis due to animal hair and dander        Plan:       Upper respiratory tract infection, unspecified type  -     dexAMETHasone injection 8 mg  -     azithromycin (Z-WILLY) 250 MG tablet; Take 2 tablets by mouth on day 1; Take 1 tablet by mouth on days 2-5  Dispense: 6 tablet; Refill: 0    Cough, unspecified type  -     SARS Coronavirus 2 Antigen, POCT Manual Read  -     POCT Influenza A/B Rapid Antigen  -     brompheniramine-pseudoeph-DM (BROMFED DM) 2-30-10 mg/5 mL Syrp; Take 5 mLs by mouth every 8 (eight) hours as needed (cough/congestion).  Dispense: 150 mL;  Refill: 0    Allergic rhinitis due to animal hair and dander  -     fluticasone propionate (FLONASE) 50 mcg/actuation nasal spray; 1 spray (50 mcg total) by Each Nostril route once daily.  Dispense: 11.1 mL; Refill: 2      Rapid flu and covid are negative. Pt informed in clinic. Given duration of symptoms, likely viral etiology. Risk and benefit of steroid treatment discussed with patient. Through shared decision making between patient and provider, opted to trial a short course of steroids for symptom management. Strict return prxn provided.  Presumptively given a z-pack given local prevalence of mycoplasma and duration of symptoms 5 days with recommendations to take if symptoms persist past 7 days.

## 2024-12-22 NOTE — PATIENT INSTRUCTIONS
Wait to take the z pack until you have been sick 7-10 days  Do not take dayquil with the bromphed  May take a benadryl at night as needed   Use the flonase every day even when you're not sick

## 2025-04-11 ENCOUNTER — OFFICE VISIT (OUTPATIENT)
Dept: URGENT CARE | Facility: CLINIC | Age: 40
End: 2025-04-11
Payer: MEDICAID

## 2025-04-11 VITALS
DIASTOLIC BLOOD PRESSURE: 74 MMHG | TEMPERATURE: 98 F | HEART RATE: 66 BPM | RESPIRATION RATE: 16 BRPM | OXYGEN SATURATION: 97 % | SYSTOLIC BLOOD PRESSURE: 111 MMHG

## 2025-04-11 DIAGNOSIS — T78.40XA ALLERGIC REACTION, INITIAL ENCOUNTER: ICD-10-CM

## 2025-04-11 DIAGNOSIS — T78.3XXA ANGIOEDEMA, INITIAL ENCOUNTER: ICD-10-CM

## 2025-04-11 DIAGNOSIS — R60.0 LIP EDEMA: Primary | ICD-10-CM

## 2025-04-11 RX ORDER — DEXAMETHASONE SODIUM PHOSPHATE 4 MG/ML
8 INJECTION, SOLUTION INTRA-ARTICULAR; INTRALESIONAL; INTRAMUSCULAR; INTRAVENOUS; SOFT TISSUE
Status: COMPLETED | OUTPATIENT
Start: 2025-04-11 | End: 2025-04-11

## 2025-04-11 RX ORDER — CETIRIZINE HYDROCHLORIDE 10 MG/1
10 TABLET ORAL
Status: COMPLETED | OUTPATIENT
Start: 2025-04-11 | End: 2025-04-11

## 2025-04-11 RX ORDER — FAMOTIDINE 20 MG/1
40 TABLET, FILM COATED ORAL
Status: COMPLETED | OUTPATIENT
Start: 2025-04-11 | End: 2025-04-11

## 2025-04-11 RX ORDER — PREDNISONE 20 MG/1
40 TABLET ORAL DAILY
Qty: 10 TABLET | Refills: 0 | Status: SHIPPED | OUTPATIENT
Start: 2025-04-12 | End: 2025-04-17

## 2025-04-11 RX ORDER — CETIRIZINE HYDROCHLORIDE 10 MG/1
10 TABLET ORAL DAILY
Qty: 7 TABLET | Refills: 0 | Status: SHIPPED | OUTPATIENT
Start: 2025-04-12 | End: 2025-04-19

## 2025-04-11 RX ORDER — FAMOTIDINE 40 MG/1
40 TABLET, FILM COATED ORAL DAILY
Qty: 7 TABLET | Refills: 0 | Status: SHIPPED | OUTPATIENT
Start: 2025-04-12 | End: 2025-04-19

## 2025-04-11 RX ADMIN — CETIRIZINE HYDROCHLORIDE 10 MG: 10 TABLET ORAL at 08:04

## 2025-04-11 RX ADMIN — DEXAMETHASONE SODIUM PHOSPHATE 8 MG: 4 INJECTION, SOLUTION INTRA-ARTICULAR; INTRALESIONAL; INTRAMUSCULAR; INTRAVENOUS; SOFT TISSUE at 08:04

## 2025-04-11 RX ADMIN — FAMOTIDINE 40 MG: 20 TABLET, FILM COATED ORAL at 08:04

## 2025-04-11 NOTE — PROGRESS NOTES
Subjective:      Patient ID: Soinya Tran is a 40 y.o. female.    Vitals:  temperature is 98.4 °F (36.9 °C). Her blood pressure is 111/74 and her pulse is 66. Her respiration is 16 and oxygen saturation is 97%.     Chief Complaint: Allergic Reaction    Patient is a 40-year-old female who presents to clinic for evaluation of allergic reaction.  Patient states symptoms began 3 days ago.  Patient states has a history of this.  Patient states that she has an allergy to milk and certain ice creams.  Patient states tried another ice cream as this was different from previous ice cream she has ate.  Patient states that after she finished eating ice cream she has some tingling in the lips and woke up the next morning with swelling.  Patient states took Sudafed over-the-counter which did help significantly improve symptoms.  Patient states has some slight swelling left.  Patient states that she has had to get a steroid shot in the past which did help significantly.  Patient states that she has not experienced any drooling, difficulty speaking or swallowing, shortness for breath, abnormal breath sounds, chest pain, abdominal pain, nausea or vomiting, headaches or dizziness, neck swelling, or change in mentation.  Patient states no prescription medicine use such as ACE inhibitors or arbs.    Allergic Reaction  Pertinent negatives include no abdominal pain, chest pain, coughing, diarrhea, drooling, rash, stridor, trouble swallowing, vomiting or wheezing.       Constitution: Negative. Negative for chills, sweating, fatigue and fever.   HENT:  Positive for facial swelling (Lip). Negative for ear pain, drooling, mouth sores, tongue pain, congestion, sore throat, trouble swallowing and voice change.    Neck: neck negative. Negative for neck swelling.   Cardiovascular: Negative.  Negative for chest pain and palpitations.   Eyes: Negative.    Respiratory: Negative.  Negative for chest tightness, cough, shortness of breath, stridor and  wheezing.    Gastrointestinal: Negative.  Negative for abdominal pain, nausea, vomiting and diarrhea.   Endocrine: negative.   Genitourinary: Negative.  Negative for dysuria, frequency and urgency.   Musculoskeletal: Negative.    Skin: Negative.  Negative for color change, pale, rash and erythema.   Allergic/Immunologic: Negative.    Neurological: Negative.  Negative for dizziness, light-headedness, passing out, headaches, disorientation and altered mental status.   Hematologic/Lymphatic: Negative.    Psychiatric/Behavioral: Negative.  Negative for altered mental status, disorientation and confusion.       Objective:     Physical Exam   Constitutional: She is oriented to person, place, and time. She appears well-developed. She is cooperative.  Non-toxic appearance. She does not appear ill. No distress.   HENT:   Head: Normocephalic and atraumatic.   Ears:   Right Ear: Hearing, tympanic membrane, external ear and ear canal normal.   Left Ear: Hearing, tympanic membrane, external ear and ear canal normal.   Nose: Nose normal. No mucosal edema, rhinorrhea, nasal deformity or congestion. No epistaxis. Right sinus exhibits no maxillary sinus tenderness and no frontal sinus tenderness. Left sinus exhibits no maxillary sinus tenderness and no frontal sinus tenderness.   Mouth/Throat: Uvula is midline, oropharynx is clear and moist and mucous membranes are normal. Mucous membranes are moist. No trismus in the jaw. Normal dentition. No uvula swelling. No oropharyngeal exudate or posterior oropharyngeal erythema. Oropharynx is clear.       Eyes: Conjunctivae and lids are normal. Pupils are equal, round, and reactive to light. Right eye exhibits no discharge. Left eye exhibits no discharge. No scleral icterus.   Neck: Trachea normal and phonation normal. Neck supple. No neck rigidity present.   Cardiovascular: Normal rate, regular rhythm, normal heart sounds and normal pulses.   Pulmonary/Chest: Effort normal and breath sounds  normal. No respiratory distress. She has no wheezes. She has no rhonchi. She has no rales.   Abdominal: Normal appearance and bowel sounds are normal. She exhibits no distension. Soft. There is no abdominal tenderness.   Musculoskeletal: Normal range of motion.         General: Normal range of motion.      Cervical back: She exhibits no tenderness.   Lymphadenopathy:     She has no cervical adenopathy.   Neurological: She is alert and oriented to person, place, and time. She exhibits normal muscle tone.   Skin: Skin is warm, dry, intact, not diaphoretic, not pale and no rash. Capillary refill takes less than 2 seconds. No erythema   Psychiatric: Her speech is normal and behavior is normal. Judgment and thought content normal.   Nursing note and vitals reviewed.      Assessment:     1. Lip edema    2. Angioedema, initial encounter    3. Allergic reaction, initial encounter        Plan:       Lip edema    Angioedema, initial encounter    Allergic reaction, initial encounter    Other orders  -     dexAMETHasone injection 8 mg  -     famotidine tablet 40 mg  -     cetirizine tablet 10 mg  -     predniSONE (DELTASONE) 20 MG tablet; Take 2 tablets (40 mg total) by mouth once daily. for 5 days  Dispense: 10 tablet; Refill: 0  -     famotidine (PEPCID) 40 MG tablet; Take 1 tablet (40 mg total) by mouth once daily. for 7 days  Dispense: 7 tablet; Refill: 0  -     cetirizine (ZYRTEC) 10 MG tablet; Take 1 tablet (10 mg total) by mouth once daily. for 7 days  Dispense: 7 tablet; Refill: 0                Decadron 8 mg IM, Pepcid 40 mg p.o., and Zyrtec 10 mg p.o. in clinic.  Patient tolerated well.  No complications noted.    Take medications as prescribed.    Tylenol/Motrin per package instructions for any pain or fever.    Recommend discontinuing drinking milk or eating ice cream or any other dairy products until following up with PCP.    Follow-up with PCP in 1-2 days.    Return to clinic as needed.    To ED for any continued,  new come or acutely worsening symptoms.    Patient in agreement with plan of care.    DISCLAIMER: Please note that my documentation in this Electronic Healthcare Record was produced using speech recognition software and therefore may contain errors related to that software system.These could include grammar, punctuation and spelling errors or the inclusion/exclusion of phrases that were not intended. Garbled syntax, mangled pronouns, and other bizarre constructions may be attributed to that software system.

## 2025-04-17 RX ORDER — FAMOTIDINE 40 MG/1
TABLET, FILM COATED ORAL
Qty: 7 TABLET | Refills: 0 | Status: SHIPPED | OUTPATIENT
Start: 2025-04-17

## 2025-04-23 RX ORDER — FAMOTIDINE 40 MG/1
TABLET, FILM COATED ORAL
Qty: 7 TABLET | Refills: 0 | Status: SHIPPED | OUTPATIENT
Start: 2025-04-23

## 2025-06-02 RX ORDER — CETIRIZINE HYDROCHLORIDE 10 MG/1
10 TABLET ORAL
Qty: 7 TABLET | Refills: 0 | Status: SHIPPED | OUTPATIENT
Start: 2025-06-02